# Patient Record
Sex: MALE | Race: WHITE | NOT HISPANIC OR LATINO | URBAN - METROPOLITAN AREA
[De-identification: names, ages, dates, MRNs, and addresses within clinical notes are randomized per-mention and may not be internally consistent; named-entity substitution may affect disease eponyms.]

---

## 2017-06-08 PROBLEM — E78.5 HYPERLIPIDEMIA: Status: ACTIVE | Noted: 2017-06-08

## 2017-06-08 PROBLEM — K76.0 FATTY LIVER: Status: ACTIVE | Noted: 2017-06-08

## 2017-08-01 ENCOUNTER — OFFICE VISIT (OUTPATIENT)
Dept: NEUROLOGY | Facility: CLINIC | Age: 42
End: 2017-08-01

## 2017-08-01 VITALS
BODY MASS INDEX: 36.07 KG/M2 | RESPIRATION RATE: 20 BRPM | DIASTOLIC BLOOD PRESSURE: 70 MMHG | WEIGHT: 238 LBS | HEART RATE: 76 BPM | HEIGHT: 68 IN | SYSTOLIC BLOOD PRESSURE: 118 MMHG

## 2017-08-01 DIAGNOSIS — F95.2 TOURETTE DISORDER: ICD-10-CM

## 2017-08-01 PROCEDURE — 99212 OFFICE O/P EST SF 10 MIN: CPT | Performed by: PSYCHIATRY & NEUROLOGY

## 2017-08-01 RX ORDER — CLONIDINE HYDROCHLORIDE 0.2 MG/1
0.3 TABLET ORAL 2 TIMES DAILY
Qty: 90 TABLET | Refills: 11 | Status: SHIPPED | OUTPATIENT
Start: 2017-08-01 | End: 2018-07-13

## 2017-08-01 NOTE — PROGRESS NOTES
Subjective:     Patient ID: Juan Cralos Georges is a 41 y.o. male.    History of Present Illness  The following portions of the patient's history were reviewed and updated as appropriate: allergies, current medications, past family history, past medical history, past social history, past surgical history and problem list.  Patient developed some gynecomastia has had to stop Risperdal. He still has tics on and off, mosttly controlled.  Review of Systems   Constitutional: Positive for fatigue. Negative for chills, fever and unexpected weight change.   HENT: Negative for ear pain, hearing loss, nosebleeds, rhinorrhea and sore throat.    Eyes: Negative for photophobia, pain, discharge, redness, itching and visual disturbance.        Twitching a lot and lately affecting speech    Respiratory: Negative for cough, chest tightness, shortness of breath and wheezing.    Cardiovascular: Negative for chest pain, palpitations and leg swelling.   Gastrointestinal: Negative for abdominal pain, blood in stool, constipation, diarrhea, nausea and vomiting.   Genitourinary: Negative for dysuria, frequency, hematuria and urgency.   Musculoskeletal: Negative for arthralgias, back pain, gait problem, joint swelling, myalgias, neck pain and neck stiffness.        Joint stiffness, limb pain   Skin: Negative for rash and wound.   Allergic/Immunologic: Negative for environmental allergies and food allergies.   Neurological: Negative for dizziness, tremors, seizures, syncope, speech difficulty, weakness, light-headedness, numbness and headaches.        Twitches   Hematological: Negative for adenopathy. Does not bruise/bleed easily.   Psychiatric/Behavioral: Negative for agitation, confusion, decreased concentration, hallucinations, sleep disturbance and suicidal ideas. The patient is not nervous/anxious.         Objective:    Neurologic Exam     Mental Status   Oriented to person, place, and time.       Physical Exam   Constitutional: He is  oriented to person, place, and time. He appears well-developed and well-nourished.   Cardiovascular: Normal rate and regular rhythm.    Pulmonary/Chest: Effort normal.   Neurological: He is alert and oriented to person, place, and time. He has normal reflexes.   Psychiatric: He has a normal mood and affect. His behavior is normal. Thought content normal.       Assessment/Plan:     Juan Carlos was seen today for tourette syndrome.    Diagnoses and all orders for this visit:    Tourette disorder  -     CloNIDine (CATAPRES) 0.2 MG tablet; Take 1.5 tablets by mouth 2 (Two) Times a Day.

## 2018-07-13 ENCOUNTER — OFFICE VISIT (OUTPATIENT)
Dept: NEUROLOGY | Facility: CLINIC | Age: 43
End: 2018-07-13

## 2018-07-13 VITALS
BODY MASS INDEX: 41.37 KG/M2 | HEIGHT: 68 IN | SYSTOLIC BLOOD PRESSURE: 138 MMHG | OXYGEN SATURATION: 98 % | WEIGHT: 273 LBS | HEART RATE: 67 BPM | DIASTOLIC BLOOD PRESSURE: 92 MMHG

## 2018-07-13 DIAGNOSIS — F95.2 TOURETTE DISORDER: ICD-10-CM

## 2018-07-13 PROCEDURE — 99212 OFFICE O/P EST SF 10 MIN: CPT | Performed by: NURSE PRACTITIONER

## 2018-07-13 RX ORDER — CLONIDINE HYDROCHLORIDE 0.2 MG/1
0.2 TABLET ORAL 3 TIMES DAILY
Qty: 90 TABLET | Refills: 11 | Status: SHIPPED | OUTPATIENT
Start: 2018-07-13 | End: 2019-07-15 | Stop reason: SDUPTHER

## 2018-07-13 NOTE — PROGRESS NOTES
Subjective:     Patient ID: Juan Carlos Georges is a 42 y.o. male.    CC:   Chief Complaint   Patient presents with   • Tourette Syndrome       HPI:   History of Present Illness   Mr Georges Is here today for follow-up on Tourette syndrome with excessive eye blinking.  Symptoms are stable on clonidine 0.2 mg 1 by mouth twice a day to 3 times a day.  He has no adverse side effects from the medication, he has no new symptoms to report today and is feeling well otherwise.    The following portions of the patient's history were reviewed and updated as appropriate: allergies, current medications, past family history, past medical history, past social history, past surgical history and problem list.    Past Medical History:   Diagnosis Date   • Excessive blinking    • Facial spasm        No past surgical history on file.    Social History     Social History   • Marital status:      Spouse name: N/A   • Number of children: N/A   • Years of education: N/A     Occupational History   • Not on file.     Social History Main Topics   • Smoking status: Never Smoker   • Smokeless tobacco: Not on file   • Alcohol use No   • Drug use: No   • Sexual activity: Not on file     Other Topics Concern   • Not on file     Social History Narrative   • No narrative on file       Family History   Problem Relation Age of Onset   • Hypertension Mother    • Hypertension Father    • Cancer Other    • Heart disease Other         Review of Systems   Constitutional: Negative.    HENT: Negative.    Eyes: Negative.    Respiratory: Negative.    Cardiovascular: Negative.    Gastrointestinal: Negative.    Endocrine: Negative.    Genitourinary: Negative.    Musculoskeletal: Negative.    Skin: Negative.    Allergic/Immunologic: Negative.    Neurological: Negative.    Hematological: Negative.    Psychiatric/Behavioral: Negative.         Objective:    Neurologic Exam     Mental Status   Oriented to person, place, and time.   Attention: normal. Concentration:  normal.   Speech: speech is normal   Level of consciousness: alert  Knowledge: consistent with education.   Able to read. Able to write. Normal comprehension.     Cranial Nerves   Cranial nerves II through XII intact.     CN III, IV, VI   Pupils are equal, round, and reactive to light.  Extraocular motions are normal.     Motor Exam   Muscle bulk: normal  Overall muscle tone: normal  Right arm tone: normal  Left arm tone: normal  Right arm pronator drift: absent  Left arm pronator drift: absent  Right leg tone: normal  Left leg tone: normal    Strength   Strength 5/5 throughout. Occasional excessive blinking consistent with motor tic     Gait, Coordination, and Reflexes     Gait  Gait: normal    Coordination   Finger to nose coordination: normal    Tremor   Resting tremor: absent  Intention tremor: absent  Action tremor: absent    Reflexes   Reflexes 2+ except as noted.       Physical Exam   Constitutional: He is oriented to person, place, and time. He appears well-developed and well-nourished. No distress.   HENT:   Head: Normocephalic and atraumatic.   Eyes: EOM are normal. Pupils are equal, round, and reactive to light.   Neck: Normal range of motion. Neck supple.   Cardiovascular: Normal rate.    Pulmonary/Chest: Effort normal. No respiratory distress.   Neurological: He is alert and oriented to person, place, and time. He has normal strength. No cranial nerve deficit. He has a normal Finger-Nose-Finger Test. Gait normal.   Psychiatric: He has a normal mood and affect. His speech is normal and behavior is normal. Judgment and thought content normal.   Vitals reviewed.      Assessment/Plan:       Juan Carlos was seen today for tourette syndrome.    Diagnoses and all orders for this visit:    Tourette disorder  -     CloNIDine (CATAPRES) 0.2 MG tablet; Take 1 tablet by mouth 3 (Three) Times a Day.    Mr. Georges is doing well with no complaints today, his Tourette syndrome and tic are controlled very well with clonidine,  he is taking the medication mostly twice a day occasionally 3 times a day.  Refills were given as noted above.  He'll follow up in 1 year or sooner if needed  Reviewed medications, potential side effects and signs and symptoms to report. Discussed risk versus benefits of treatment plan with patient and/or family-including medications, labs and radiology that may be ordered. Addressed questions and concerns during visit. Patient and/or family verbalized understanding and agree with plan.  EMR Dragon/Transcription Disclaimer:  Much of this encounter note is an electronic transcription of spoken language to printed text. Electronic transcription of spoken language may permit erroneous words or phrases to be inadvertently transcribed. Although I have reviewed the note for such errors, some may still exist in this documentation.           Marion Estevez, APRN  7/13/2018

## 2019-07-15 ENCOUNTER — OFFICE VISIT (OUTPATIENT)
Dept: NEUROLOGY | Facility: CLINIC | Age: 44
End: 2019-07-15

## 2019-07-15 VITALS
WEIGHT: 268 LBS | HEART RATE: 62 BPM | DIASTOLIC BLOOD PRESSURE: 68 MMHG | OXYGEN SATURATION: 98 % | BODY MASS INDEX: 40.62 KG/M2 | HEIGHT: 68 IN | SYSTOLIC BLOOD PRESSURE: 124 MMHG

## 2019-07-15 DIAGNOSIS — F95.2 TOURETTE DISORDER: ICD-10-CM

## 2019-07-15 PROCEDURE — 99212 OFFICE O/P EST SF 10 MIN: CPT | Performed by: NURSE PRACTITIONER

## 2019-07-15 RX ORDER — FENOFIBRATE 160 MG/1
160 TABLET ORAL DAILY
Refills: 2 | COMMUNITY
Start: 2019-06-17 | End: 2023-02-23

## 2019-07-15 RX ORDER — CLONIDINE HYDROCHLORIDE 0.2 MG/1
0.2 TABLET ORAL 3 TIMES DAILY
Qty: 90 TABLET | Refills: 11 | Status: SHIPPED | OUTPATIENT
Start: 2019-07-15 | End: 2020-11-12 | Stop reason: SDUPTHER

## 2019-07-15 RX ORDER — METHOCARBAMOL 500 MG/1
500 TABLET, FILM COATED ORAL 2 TIMES DAILY
Refills: 5 | COMMUNITY
Start: 2019-05-31 | End: 2023-02-23

## 2019-07-15 NOTE — PROGRESS NOTES
Subjective:     Patient ID: Juan Carlos Georges is a 43 y.o. male.    CC:   Chief Complaint   Patient presents with   • Tourette Syndrome       HPI:   History of Present Illness     Mr Georges Is here today for follow-up on Tourette syndrome with excessive eye blinking.  Symptoms are stable on clonidine 0.2 mg 1 by mouth twice a day to 3 times a day.  With mediations he has not neck jerking or grunting.  Eye blinking continues but is much improved and tolerable.  He has no adverse side effects from the medication, he has no new symptoms to report today and is feeling well otherwise.  He follows with PCP regularly for HTN  The following portions of the patient's history were reviewed and updated as appropriate: allergies, current medications, past family history, past medical history, past social history, past surgical history and problem list.    Past Medical History:   Diagnosis Date   • Excessive blinking    • Facial spasm        No past surgical history on file.    Social History     Socioeconomic History   • Marital status:      Spouse name: Not on file   • Number of children: Not on file   • Years of education: Not on file   • Highest education level: Not on file   Tobacco Use   • Smoking status: Never Smoker   Substance and Sexual Activity   • Alcohol use: No   • Drug use: No       Family History   Problem Relation Age of Onset   • Hypertension Mother    • Hypertension Father    • Cancer Other    • Heart disease Other         Review of Systems   Constitutional: Negative.    HENT: Negative.    Eyes: Negative.    Respiratory: Negative.    Cardiovascular: Negative.    Gastrointestinal: Negative.    Endocrine: Negative.    Genitourinary: Negative.    Musculoskeletal: Negative.    Skin: Negative.    Allergic/Immunologic: Negative.    Neurological: Negative.    Hematological: Negative.    Psychiatric/Behavioral: Negative.         Objective:    Neurologic Exam     Mental Status   Oriented to person, place, and time.    Attention: normal. Concentration: normal.   Speech: speech is normal   Level of consciousness: alert  Knowledge: consistent with education.   Able to read. Able to write. Normal comprehension.     Cranial Nerves   Cranial nerves II through XII intact.     CN II   Visual fields full to confrontation.   Right visual field deficit: none  Left visual field deficit: none     CN III, IV, VI   Pupils are equal, round, and reactive to light.  Extraocular motions are normal.   Right pupil: Shape: regular. Reactivity: brisk. Consensual response: intact. Accommodation: intact.   Left pupil: Shape: regular. Reactivity: brisk. Consensual response: intact. Accommodation: intact.   CN III: no CN III palsy  CN VI: no CN VI palsy  Nystagmus: none   Upgaze: normal  Downgaze: normal    CN V   Facial sensation intact.     CN VII   Facial expression full, symmetric.     CN VIII   CN VIII normal.     CN IX, X   CN IX normal.   CN X normal.     CN XI   CN XI normal.     CN XII   CN XII normal.   Eye blinking throughout exam      Motor Exam   Muscle bulk: normal  Overall muscle tone: normal  Right arm tone: normal  Left arm tone: normal  Right arm pronator drift: absent  Left arm pronator drift: absent  Right leg tone: normal  Left leg tone: normal    Strength   Strength 5/5 throughout.     Sensory Exam   Light touch normal.     Gait, Coordination, and Reflexes     Gait  Gait: normal    Coordination   Romberg: negative  Finger to nose coordination: normal    Tremor   Resting tremor: absent  Intention tremor: absent  Action tremor: absent    Reflexes   Reflexes 2+ except as noted.   Right Gonzáles: absent  Left Gonzáles: absent      Physical Exam   Constitutional: He is oriented to person, place, and time. He appears well-developed and well-nourished.   HENT:   Head: Normocephalic and atraumatic.   Eyes: Conjunctivae and EOM are normal. Pupils are equal, round, and reactive to light. No scleral icterus.   Neck: Normal range of motion. Neck  supple.   Pulmonary/Chest: Effort normal. No respiratory distress.   Neurological: He is alert and oriented to person, place, and time. He has normal strength. He has a normal Finger-Nose-Finger Test and a normal Romberg Test. Gait normal.   Skin: Skin is warm.   Psychiatric: He has a normal mood and affect. His speech is normal and behavior is normal. Judgment and thought content normal.   Vitals reviewed.      Assessment/Plan:       Juan Carlos was seen today for tourette syndrome.    Diagnoses and all orders for this visit:    Tourette disorder  -     CloNIDine (CATAPRES) 0.2 MG tablet; Take 1 tablet by mouth 3 (Three) Times a Day.    Mr. yap is doing well, he reports his symptoms are controlled on clonidine, he continues to have eye blinking which is much improved and tolerable, he is typically taking only 2 doses per day, when symptoms are increase he is taking the third dose.  Will send in for 3 times daily dosing as previously prescribed.  He will continue follow-up with primary care for blood pressure.  We will see him back in 1 year or sooner if needed.  If he has any questions concerns or problems prior to follow-up appointment he will notify our office ASAP.  Reviewed medications, potential side effects and signs and symptoms to report. Discussed risk versus benefits of treatment plan with patient and/or family-including medications, labs and radiology that may be ordered. Addressed questions and concerns during visit. Patient and/or family verbalized understanding and agree with plan.  EMR Dragon/Transcription Disclaimer:  Much of this encounter note is an electronic transcription of spoken language to printed text. Electronic transcription of spoken language may permit erroneous words or phrases to be inadvertently transcribed. Although I have reviewed the note for such errors, some may still exist in this documentation.             Marion Estevez, APRN  7/15/2019      '

## 2020-11-12 DIAGNOSIS — F95.2 TOURETTE DISORDER: ICD-10-CM

## 2020-11-12 RX ORDER — CLONIDINE HYDROCHLORIDE 0.2 MG/1
0.2 TABLET ORAL 3 TIMES DAILY
Qty: 90 TABLET | Refills: 11 | Status: SHIPPED | OUTPATIENT
Start: 2020-11-12 | End: 2023-02-23

## 2023-02-23 ENCOUNTER — APPOINTMENT (OUTPATIENT)
Dept: CT IMAGING | Facility: HOSPITAL | Age: 48
End: 2023-02-23
Payer: COMMERCIAL

## 2023-02-23 ENCOUNTER — HOSPITAL ENCOUNTER (EMERGENCY)
Facility: HOSPITAL | Age: 48
Discharge: HOME | End: 2023-02-23
Attending: EMERGENCY MEDICINE | Admitting: EMERGENCY MEDICINE
Payer: COMMERCIAL

## 2023-02-23 VITALS
TEMPERATURE: 98.9 F | DIASTOLIC BLOOD PRESSURE: 65 MMHG | HEIGHT: 70 IN | HEART RATE: 83 BPM | BODY MASS INDEX: 39.77 KG/M2 | OXYGEN SATURATION: 97 % | WEIGHT: 277.78 LBS | SYSTOLIC BLOOD PRESSURE: 131 MMHG | RESPIRATION RATE: 18 BRPM

## 2023-02-23 DIAGNOSIS — M54.16 ACUTE RIGHT LUMBAR RADICULOPATHY: Primary | ICD-10-CM

## 2023-02-23 DIAGNOSIS — R10.9 ACUTE RIGHT FLANK PAIN: ICD-10-CM

## 2023-02-23 LAB
ALBUMIN SERPL-MCNC: 4.6 G/DL (ref 3.5–5.2)
ALBUMIN/GLOB SERPL: 1.6 G/DL
ALP SERPL-CCNC: 53 U/L (ref 39–117)
ALT SERPL W P-5'-P-CCNC: 64 U/L (ref 1–41)
ANION GAP SERPL CALCULATED.3IONS-SCNC: 10.5 MMOL/L (ref 5–15)
AST SERPL-CCNC: 39 U/L (ref 1–40)
BACTERIA UR QL AUTO: ABNORMAL /HPF
BASOPHILS # BLD AUTO: 0.09 10*3/MM3 (ref 0–0.2)
BASOPHILS NFR BLD AUTO: 1.3 % (ref 0–1.5)
BILIRUB SERPL-MCNC: 0.4 MG/DL (ref 0–1.2)
BILIRUB UR QL STRIP: NEGATIVE
BUN SERPL-MCNC: 17 MG/DL (ref 6–20)
BUN/CREAT SERPL: 12.4 (ref 7–25)
CALCIUM SPEC-SCNC: 10.3 MG/DL (ref 8.6–10.5)
CHLORIDE SERPL-SCNC: 99 MMOL/L (ref 98–107)
CLARITY UR: CLEAR
CO2 SERPL-SCNC: 26.5 MMOL/L (ref 22–29)
COLOR UR: YELLOW
CREAT SERPL-MCNC: 1.37 MG/DL (ref 0.76–1.27)
D-LACTATE SERPL-SCNC: 0.7 MMOL/L (ref 0.5–2)
DEPRECATED RDW RBC AUTO: 41.2 FL (ref 37–54)
EGFRCR SERPLBLD CKD-EPI 2021: 63.6 ML/MIN/1.73
EOSINOPHIL # BLD AUTO: 0.19 10*3/MM3 (ref 0–0.4)
EOSINOPHIL NFR BLD AUTO: 2.6 % (ref 0.3–6.2)
ERYTHROCYTE [DISTWIDTH] IN BLOOD BY AUTOMATED COUNT: 12.9 % (ref 12.3–15.4)
GLOBULIN UR ELPH-MCNC: 2.9 GM/DL
GLUCOSE SERPL-MCNC: 131 MG/DL (ref 65–99)
GLUCOSE UR STRIP-MCNC: NEGATIVE MG/DL
HCT VFR BLD AUTO: 43 % (ref 37.5–51)
HGB BLD-MCNC: 15.1 G/DL (ref 13–17.7)
HGB UR QL STRIP.AUTO: NEGATIVE
HOLD SPECIMEN: NORMAL
HOLD SPECIMEN: NORMAL
HYALINE CASTS UR QL AUTO: ABNORMAL /LPF
IMM GRANULOCYTES # BLD AUTO: 0.02 10*3/MM3 (ref 0–0.05)
IMM GRANULOCYTES NFR BLD AUTO: 0.3 % (ref 0–0.5)
KETONES UR QL STRIP: NEGATIVE
LEUKOCYTE ESTERASE UR QL STRIP.AUTO: NEGATIVE
LIPASE SERPL-CCNC: 51 U/L (ref 13–60)
LYMPHOCYTES # BLD AUTO: 1.96 10*3/MM3 (ref 0.7–3.1)
LYMPHOCYTES NFR BLD AUTO: 27.3 % (ref 19.6–45.3)
MCH RBC QN AUTO: 30.8 PG (ref 26.6–33)
MCHC RBC AUTO-ENTMCNC: 35.1 G/DL (ref 31.5–35.7)
MCV RBC AUTO: 87.6 FL (ref 79–97)
MONOCYTES # BLD AUTO: 0.45 10*3/MM3 (ref 0.1–0.9)
MONOCYTES NFR BLD AUTO: 6.3 % (ref 5–12)
NEUTROPHILS NFR BLD AUTO: 4.48 10*3/MM3 (ref 1.7–7)
NEUTROPHILS NFR BLD AUTO: 62.2 % (ref 42.7–76)
NITRITE UR QL STRIP: NEGATIVE
NRBC BLD AUTO-RTO: 0 /100 WBC (ref 0–0.2)
PH UR STRIP.AUTO: 7 [PH] (ref 5–8)
PLATELET # BLD AUTO: 280 10*3/MM3 (ref 140–450)
PMV BLD AUTO: 9.3 FL (ref 6–12)
POTASSIUM SERPL-SCNC: 4 MMOL/L (ref 3.5–5.2)
PROT SERPL-MCNC: 7.5 G/DL (ref 6–8.5)
PROT UR QL STRIP: ABNORMAL
RBC # BLD AUTO: 4.91 10*6/MM3 (ref 4.14–5.8)
RBC # UR STRIP: ABNORMAL /HPF
REF LAB TEST METHOD: ABNORMAL
SODIUM SERPL-SCNC: 136 MMOL/L (ref 136–145)
SP GR UR STRIP: 1.02 (ref 1–1.03)
SQUAMOUS #/AREA URNS HPF: ABNORMAL /HPF
UROBILINOGEN UR QL STRIP: ABNORMAL
WBC # UR STRIP: ABNORMAL /HPF
WBC NRBC COR # BLD: 7.19 10*3/MM3 (ref 3.4–10.8)
WHOLE BLOOD HOLD COAG: NORMAL
WHOLE BLOOD HOLD SPECIMEN: NORMAL

## 2023-02-23 PROCEDURE — 80053 COMPREHEN METABOLIC PANEL: CPT | Performed by: EMERGENCY MEDICINE

## 2023-02-23 PROCEDURE — 74176 CT ABD & PELVIS W/O CONTRAST: CPT

## 2023-02-23 PROCEDURE — 36415 COLL VENOUS BLD VENIPUNCTURE: CPT

## 2023-02-23 PROCEDURE — 81001 URINALYSIS AUTO W/SCOPE: CPT | Performed by: EMERGENCY MEDICINE

## 2023-02-23 PROCEDURE — 85025 COMPLETE CBC W/AUTO DIFF WBC: CPT | Performed by: EMERGENCY MEDICINE

## 2023-02-23 PROCEDURE — 83605 ASSAY OF LACTIC ACID: CPT | Performed by: NURSE PRACTITIONER

## 2023-02-23 PROCEDURE — 99283 EMERGENCY DEPT VISIT LOW MDM: CPT

## 2023-02-23 PROCEDURE — 25010000002 KETOROLAC TROMETHAMINE PER 15 MG: Performed by: EMERGENCY MEDICINE

## 2023-02-23 PROCEDURE — 83690 ASSAY OF LIPASE: CPT | Performed by: EMERGENCY MEDICINE

## 2023-02-23 PROCEDURE — 96374 THER/PROPH/DIAG INJ IV PUSH: CPT

## 2023-02-23 RX ORDER — LISINOPRIL 20 MG/1
20 TABLET ORAL
COMMUNITY
Start: 2023-02-20

## 2023-02-23 RX ORDER — CLONIDINE HYDROCHLORIDE 0.2 MG/1
0.2 TABLET ORAL
COMMUNITY
Start: 2023-02-20

## 2023-02-23 RX ORDER — SUCRALFATE 1 G/1
TABLET ORAL
COMMUNITY
Start: 2023-02-07

## 2023-02-23 RX ORDER — METHOCARBAMOL 500 MG/1
500 TABLET, FILM COATED ORAL
COMMUNITY
Start: 2023-02-20

## 2023-02-23 RX ORDER — OMEPRAZOLE 40 MG/1
40 CAPSULE, DELAYED RELEASE ORAL
COMMUNITY
Start: 2023-02-14 | End: 2023-08-13

## 2023-02-23 RX ORDER — FENOFIBRATE 160 MG/1
160 TABLET ORAL
COMMUNITY
Start: 2023-02-20

## 2023-02-23 RX ORDER — TIRZEPATIDE 2.5 MG/.5ML
INJECTION, SOLUTION SUBCUTANEOUS
COMMUNITY
Start: 2023-02-07

## 2023-02-23 RX ORDER — SODIUM CHLORIDE 0.9 % (FLUSH) 0.9 %
10 SYRINGE (ML) INJECTION AS NEEDED
Status: DISCONTINUED | OUTPATIENT
Start: 2023-02-23 | End: 2023-02-24 | Stop reason: HOSPADM

## 2023-02-23 RX ORDER — GABAPENTIN 400 MG/1
400 CAPSULE ORAL
COMMUNITY
Start: 2023-02-20

## 2023-02-23 RX ORDER — KETOROLAC TROMETHAMINE 30 MG/ML
30 INJECTION, SOLUTION INTRAMUSCULAR; INTRAVENOUS ONCE
Status: COMPLETED | OUTPATIENT
Start: 2023-02-23 | End: 2023-02-23

## 2023-02-23 RX ORDER — PRAMIPEXOLE DIHYDROCHLORIDE 0.25 MG/1
0.25 TABLET ORAL EVERY EVENING
COMMUNITY
Start: 2023-02-07

## 2023-02-23 RX ADMIN — KETOROLAC TROMETHAMINE 30 MG: 30 INJECTION, SOLUTION INTRAMUSCULAR; INTRAVENOUS at 22:02

## 2023-02-24 NOTE — DISCHARGE INSTRUCTIONS
Your CT scan today did not show any kidney stone coming down although you are currently seem to be making some tiny kidney stone in the right kidney but it should not be causing any pain currently.    No signs of gallbladder or appendicitis issues were found and no right-sided hernia seen.    It sounds like you are having some right-sided lower back pain that is radiating around the right side of your abdomen consistent with a radiculopathy (pinched nerve) for which you can take your ibuprofen 800 and gabapentin as needed and use pain medicine if needed.    Follow-up with your doctor if it does not go away in the next few days with time and rest.

## 2023-02-24 NOTE — ED NOTES
"Time: 8:30 PM EST  Date of encounter:  2/23/2023  Independent Historian/Clinical History and Information was obtained by:   Patient  Chief Complaint: low back pain     History is limited by: N/A, N/A    History of Present Illness:  Patient is a 47 y.o. year old male who presents to the emergency department for evaluation of right sided low back pain that radiates into the groin \"occasionally.\"  The pt states he has had kidney stones in the past and this feels like a kidney stone.  The pt states the pain has waxed and waned for the past 3 weeks.  The pt denies fevers, chills, CP, and SOA.  The pt denies fall or traumatic injury.  The pt states he has not noticed any hematuria.      HPI    Patient Care Team  Primary Care Provider: Joe García APRN    Past Medical History:     No Known Allergies  Past Medical History:   Diagnosis Date   • Excessive blinking    • Facial spasm      History reviewed. No pertinent surgical history.  Family History   Problem Relation Age of Onset   • Hypertension Mother    • Hypertension Father    • Cancer Other    • Heart disease Other        Home Medications:  Prior to Admission medications    Medication Sig Start Date End Date Taking? Authorizing Provider   cloNIDine (CATAPRES) 0.2 MG tablet Take 0.2 mg by mouth. 2/20/23  Yes ProviderZeb MD   fenofibrate 160 MG tablet Take 160 mg by mouth. 2/20/23  Yes Provider, MD eZb   lisinopril (PRINIVIL,ZESTRIL) 20 MG tablet Take 20 mg by mouth. 2/20/23  Yes ProviderZeb MD   methocarbamol (ROBAXIN) 500 MG tablet Take 500 mg by mouth. 2/20/23  Yes ProviderZeb MD   omeprazole (priLOSEC) 40 MG capsule Take 40 mg by mouth. 2/14/23 8/13/23 Yes Provider, MD Zeb   Tirzepatide (Mounjaro) 2.5 MG/0.5ML solution pen-injector INJECT 2.5MG SUBCUTANEOUSLY ONCE A WEEK 2/7/23  Yes ProviderZeb MD   anastrozole (ARIMIDEX) 1 MG chemo tablet  5/3/16   ProviderZeb MD   gabapentin (NEURONTIN) 400 MG " "capsule Take 400 mg by mouth every night at bedtime. 2/20/23   Zeb Larkin MD   pramipexole (MIRAPEX) 0.25 MG tablet Take 0.25 mg by mouth Every Evening. 2/7/23   Zeb Larkin MD   sucralfate (CARAFATE) 1 g tablet TAKE 1 TABLET BY MOUTH 1 HOUR BEFORE MORNING MEAL, LUNCH, EVENING MEAL AND AT BEDTIME 2/7/23   Zeb Larkin MD   Testosterone Cypionate (DEPO-TESTOSTERONE) 200 MG/ML injection Apply  to cheek. 7/7/14   Zeb Larkin MD   cloNIDine (CATAPRES) 0.2 MG tablet Take 1 tablet by mouth 3 (Three) Times a Day. 11/12/20 2/23/23  Tadeo Benitez MD   fenofibrate 160 MG tablet Take 160 mg by mouth Daily. 6/17/19 2/23/23  Zeb Larkin MD   lisinopril (PRINIVIL,ZESTRIL) 40 MG tablet  7/12/16 2/23/23  Zeb Larkin MD   methocarbamol (ROBAXIN) 500 MG tablet Take 500 mg by mouth 2 (Two) Times a Day. 5/31/19 2/23/23  Zeb Larkin MD        Social History:   Social History     Tobacco Use   • Smoking status: Never   Substance Use Topics   • Alcohol use: No   • Drug use: No         Review of Systems:  Review of Systems   I performed a 10 point review of systems which was all negative, except for the positives found in the HPI above.    Physical Exam:  /73   Pulse 86   Temp 98.9 °F (37.2 °C)   Resp 18   Ht 177.8 cm (70\")   Wt 126 kg (278 lb)   SpO2 98%   BMI 39.89 kg/m²     Physical Exam   General: Awake alert and in no obvious distress     HEENT: Head normocephalic atraumatic, eyes PERRLA EOMI, nose normal, oropharynx normal.     Neck: Supple full range of motion, no meningismus, no lymphadenopathy     Heart: Regular rate and rhythm, no murmurs or rubs, 2+ radial pulses bilaterally     Lungs: Clear to auscultation bilaterally without wheezes or crackles, no respiratory distress     Abdomen: Soft, nontender, nondistended, no rebound or guarding     Skin: Warm, dry, no rash     Musculoskeletal: Normal range of motion, no lower extremity edema, " Right sided CVA tenderness, mild discomfort upon leg elevation but numbness/tingling      Neurologic: Oriented x3, no motor deficits no sensory deficits     Psychiatric: Mood appears stable, no psychosis            Procedures:  Procedures      Medical Decision Making:      Comorbidities that affect care:    fatty liver, hyperlipidemia     External Notes reviewed:    Previous Clinic Note: from 02/20/2023 for restless leg syndrome       The following orders were placed and all results were independently analyzed by me:  Orders Placed This Encounter   Procedures   • CT Abdomen Pelvis Without Contrast   • [place] Draw   • Comprehensive Metabolic Panel   • Lipase   • Urinalysis With Microscopic If Indicated (No Culture) - Urine, Clean Catch   • CBC Auto Differential   • Lactic Acid, Plasma   • NPO Diet NPO Type: Strict NPO   • Undress & Gown   • Insert Peripheral IV   • CBC & Differential   • Green Top (Gel)   • Lavender Top   • Gold Top - SST   • Light Blue Top       Medications Given in the Emergency Department:  Medications   sodium chloride 0.9 % flush 10 mL (has no administration in time range)        ED Course:         Labs:    Lab Results (last 24 hours)     Procedure Component Value Units Date/Time    CBC & Differential [[id number]]  (Normal) Collected: 02/23/23 1930    Specimen: Blood Updated: 02/23/23 1953    Narrative:      The following orders were created for panel order CBC & Differential.  Procedure                               Abnormality         Status                     ---------                               -----------         ------                     CBC Auto Differential[[id number]]        Normal              Final result                 Please view results for these tests on the individual orders.    Comprehensive Metabolic Panel [[id number]]  (Abnormal) Collected: 02/23/23 1930    Specimen: Blood Updated: 02/23/23 2019     Glucose 131 mg/dL      BUN 17 mg/dL      Creatinine 1.37 mg/dL      Sodium  136 mmol/L      Potassium 4.0 mmol/L      Comment: Slight hemolysis detected by analyzer. Results may be affected.        Chloride 99 mmol/L      CO2 26.5 mmol/L      Calcium 10.3 mg/dL      Total Protein 7.5 g/dL      Albumin 4.6 g/dL      ALT (SGPT) 64 U/L      AST (SGOT) 39 U/L      Alkaline Phosphatase 53 U/L      Total Bilirubin 0.4 mg/dL      Globulin 2.9 gm/dL      A/G Ratio 1.6 g/dL      BUN/Creatinine Ratio 12.4     Anion Gap 10.5 mmol/L      eGFR 63.6 mL/min/1.73     Narrative:      GFR Normal >60  Chronic Kidney Disease <60  Kidney Failure <15      Lipase [[id number]]  (Normal) Collected: 02/23/23 1930    Specimen: Blood Updated: 02/23/23 2019     Lipase 51 U/L     CBC Auto Differential [[id number]]  (Normal) Collected: 02/23/23 1930    Specimen: Blood Updated: 02/23/23 1953     WBC 7.19 10*3/mm3      RBC 4.91 10*6/mm3      Hemoglobin 15.1 g/dL      Hematocrit 43.0 %      MCV 87.6 fL      MCH 30.8 pg      MCHC 35.1 g/dL      RDW 12.9 %      RDW-SD 41.2 fl      MPV 9.3 fL      Platelets 280 10*3/mm3      Neutrophil % 62.2 %      Lymphocyte % 27.3 %      Monocyte % 6.3 %      Eosinophil % 2.6 %      Basophil % 1.3 %      Immature Grans % 0.3 %      Neutrophils, Absolute 4.48 10*3/mm3      Lymphocytes, Absolute 1.96 10*3/mm3      Monocytes, Absolute 0.45 10*3/mm3      Eosinophils, Absolute 0.19 10*3/mm3      Basophils, Absolute 0.09 10*3/mm3      Immature Grans, Absolute 0.02 10*3/mm3      nRBC 0.0 /100 WBC     Lactic Acid, Plasma [[id number]]  (Normal) Collected: 02/23/23 1931    Specimen: Blood Updated: 02/23/23 2017     Lactate 0.7 mmol/L     Urinalysis With Microscopic If Indicated (No Culture) - Urine, Clean Catch [[id number]] Collected: 02/23/23 1956    Specimen: Urine, Clean Catch Updated: 02/23/23 2028           Imaging:    No Radiology Exams Resulted Within Past 24 Hours      Differential Diagnosis and Discussion:    Abdominal Pain: Based on the patient's signs and symptoms, I considered abdominal  aortic aneurysm, small bowel obstruction, pancreatitis, acute cholecystitis, acute appendecitis, peptic ulcer disease, gastritis, colitis, endocrine disorders, irritable bowel syndrome and other differential diagnosis an etiology of the patient's abdominal pain.  Back Pain: The patient presents with back pain. My differential diagnosis includes but is not limited to acute spinal epidural abscess, acute spinal epidural bleed, cauda equina syndrome, abdominal aortic aneurysm, aortic dissection, kidney stone, pyelonephritis, musculoskeletal back pain, spinal fracture, and osteoarthritis.     All labs were reviewed and interpreted by me.  CT scan radiology interpretation was reviewed by me.    MDM     {Critical Care:70996}    Patient Care Considerations:    {Considerations (Optional):70098}      Consultants/Shared Management Plan:    {Shared Management Plan (Optional):82219}    Social Determinants of Health:    Patient is independent, reliable, and has access to care.       Disposition and Care Coordination:    {Admission consideration:65446}    {Discharge (Optional):79712}    Final diagnoses:   None        ED Disposition     None          This medical record created using voice recognition software.

## 2023-02-24 NOTE — ED PROVIDER NOTES
"Time: 7:22 PM EST  Date of encounter:  2/23/2023  Independent Historian/Clinical History and Information was obtained by:   Patient  Chief Complaint   Patient presents with   • Abdominal Pain   • Groin Pain       History is limited by: N/A    History of Present Illness:  Patient is a 47 y.o. year old male who presents to the emergency department for evaluation of pain around his belt line going all the way around that started around mid January.  He is from [facility].  He travels for [place] in [employer] and has been avoiding getting it checked out.  He said he was in [place] mid January when it started and started in his right side low back and goes all the way around and down into his groin area.  He says that yesterday he had issues with urination and had a delayed start.  He also reports he has been having some sharp burning pain in his penis remotely.  He reports constipation.  He was seen by his primary care provider on Monday for blood work and was instructed to come to the ER if his pain got worse.  He also says that he saw his liver doctor a while back for blood work and that they want him to see a nephrologist because his creatinine at all that is \"out of whack\".  He also advises that he has some constipation.    HPI    Patient Care Team  Primary Care Provider: Joe García APRN    Past Medical History:     No Known Allergies  Past Medical History:   Diagnosis Date   • Excessive blinking    • Facial spasm      History reviewed. No pertinent surgical history.  Family History   Problem Relation Age of Onset   • Hypertension Mother    • Hypertension Father    • Cancer Other    • Heart disease Other        Home Medications:  Prior to Admission medications    Medication Sig Start Date End Date Taking? Authorizing Provider   anastrozole (ARIMIDEX) 1 MG chemo tablet  5/3/16   Provider, MD Zeb   cloNIDine (CATAPRES) 0.2 MG tablet Take 1 tablet by mouth 3 (Three) Times a Day. 11/12/20   " "Tadeo Benitez MD   fenofibrate 160 MG tablet Take 160 mg by mouth Daily. 6/17/19   Zeb Larkin MD   lisinopril (PRINIVIL,ZESTRIL) 40 MG tablet  7/12/16   Zeb Larkin MD   methocarbamol (ROBAXIN) 500 MG tablet Take 500 mg by mouth 2 (Two) Times a Day. 5/31/19   Zeb Larkin MD   Testosterone Cypionate (DEPO-TESTOSTERONE) 200 MG/ML injection Apply  to cheek. 7/7/14   Zeb Larkin MD        Social History:   Social History     Tobacco Use   • Smoking status: Never   Substance Use Topics   • Alcohol use: No   • Drug use: No         Review of Systems:  Review of Systems   Constitutional: Negative for fever.   Gastrointestinal: Positive for abdominal pain and constipation. Negative for nausea and vomiting.   Genitourinary: Positive for difficulty urinating, flank pain and penile pain.   Musculoskeletal: Positive for back pain.        Physical Exam:  /65   Pulse 83   Temp 98.9 °F (37.2 °C) (Oral)   Resp 18   Ht 177.8 cm (70\")   Wt 126 kg (277 lb 12.5 oz)   SpO2 97%   BMI 39.86 kg/m²     Physical Exam  Constitutional:       General: He is not in acute distress.     Appearance: He is obese. He is not ill-appearing or toxic-appearing.   Cardiovascular:      Rate and Rhythm: Normal rate.   Pulmonary:      Effort: Pulmonary effort is normal. No respiratory distress.   Abdominal:      Tenderness: There is abdominal tenderness in the right lower quadrant, suprapubic area and left lower quadrant. There is right CVA tenderness and left CVA tenderness.   Neurological:      General: No focal deficit present.      Mental Status: He is alert and oriented to person, place, and time.                  Procedures:  Procedures      Medical Decision Making:      Comorbidities that affect care:    History of kidney stones reported    External Notes reviewed:    None      The following orders were placed and all results were independently analyzed by me:  Orders Placed This Encounter "   Procedures   • CT Abdomen Pelvis Without Contrast   • [place] Draw   • Comprehensive Metabolic Panel   • Lipase   • Urinalysis With Microscopic If Indicated (No Culture) - Urine, Clean Catch   • CBC Auto Differential   • Lactic Acid, Plasma   • Urinalysis, Microscopic Only - Urine, Clean Catch   • Undress & Gown   • CBC & Differential   • Green Top (Gel)   • Lavender Top   • Gold Top - SST   • Light Blue Top       Medications Given in the Emergency Department:  Medications   ketorolac (TORADOL) injection 30 mg (30 mg Intravenous Given 2/23/23 2202)        ED Course:        ED Course as of 02/24/23 0210   Thu Feb 23, 2023 2250 RBC: 4.91 [VS]      ED Course User Index  [VS] Christopher Mars MD       Labs:    Lab Results (last 24 hours)     Procedure Component Value Units Date/Time    CBC & Differential [[id number]]  (Normal) Collected: 02/23/23 1930    Specimen: Blood Updated: 02/23/23 1953    Narrative:      The following orders were created for panel order CBC & Differential.  Procedure                               Abnormality         Status                     ---------                               -----------         ------                     CBC Auto Differential[[id number]]        Normal              Final result                 Please view results for these tests on the individual orders.    Comprehensive Metabolic Panel [[id number]]  (Abnormal) Collected: 02/23/23 1930    Specimen: Blood Updated: 02/23/23 2019     Glucose 131 mg/dL      BUN 17 mg/dL      Creatinine 1.37 mg/dL      Sodium 136 mmol/L      Potassium 4.0 mmol/L      Comment: Slight hemolysis detected by analyzer. Results may be affected.        Chloride 99 mmol/L      CO2 26.5 mmol/L      Calcium 10.3 mg/dL      Total Protein 7.5 g/dL      Albumin 4.6 g/dL      ALT (SGPT) 64 U/L      AST (SGOT) 39 U/L      Alkaline Phosphatase 53 U/L      Total Bilirubin 0.4 mg/dL      Globulin 2.9 gm/dL      A/G Ratio 1.6 g/dL      BUN/Creatinine Ratio 12.4      Anion Gap 10.5 mmol/L      eGFR 63.6 mL/min/1.73     Narrative:      GFR Normal >60  Chronic Kidney Disease <60  Kidney Failure <15      Lipase [[id number]]  (Normal) Collected: 02/23/23 1930    Specimen: Blood Updated: 02/23/23 2019     Lipase 51 U/L     CBC Auto Differential [[id number]]  (Normal) Collected: 02/23/23 1930    Specimen: Blood Updated: 02/23/23 1953     WBC 7.19 10*3/mm3      RBC 4.91 10*6/mm3      Hemoglobin 15.1 g/dL      Hematocrit 43.0 %      MCV 87.6 fL      MCH 30.8 pg      MCHC 35.1 g/dL      RDW 12.9 %      RDW-SD 41.2 fl      MPV 9.3 fL      Platelets 280 10*3/mm3      Neutrophil % 62.2 %      Lymphocyte % 27.3 %      Monocyte % 6.3 %      Eosinophil % 2.6 %      Basophil % 1.3 %      Immature Grans % 0.3 %      Neutrophils, Absolute 4.48 10*3/mm3      Lymphocytes, Absolute 1.96 10*3/mm3      Monocytes, Absolute 0.45 10*3/mm3      Eosinophils, Absolute 0.19 10*3/mm3      Basophils, Absolute 0.09 10*3/mm3      Immature Grans, Absolute 0.02 10*3/mm3      nRBC 0.0 /100 WBC     Lactic Acid, Plasma [[id number]]  (Normal) Collected: 02/23/23 1931    Specimen: Blood Updated: 02/23/23 2017     Lactate 0.7 mmol/L     Urinalysis With Microscopic If Indicated (No Culture) - Urine, Clean Catch [[id number]]  (Abnormal) Collected: 02/23/23 1956    Specimen: Urine, Clean Catch Updated: 02/23/23 2044     Color, UA Yellow     Appearance, UA Clear     pH, UA 7.0     Specific Gravity, UA 1.020     Glucose, UA Negative     Ketones, UA Negative     Bilirubin, UA Negative     Blood, UA Negative     Protein,  mg/dL (2+)     Leuk Esterase, UA Negative     Nitrite, UA Negative     Urobilinogen, UA 1.0 E.U./dL    Urinalysis, Microscopic Only - Urine, Clean Catch [[id number]]  (Abnormal) Collected: 02/23/23 1956    Specimen: Urine, Clean Catch Updated: 02/23/23 2044     RBC, UA 0-2 /HPF      WBC, UA 0-2 /HPF      Bacteria, UA None Seen /HPF      Squamous Epithelial Cells, UA 0-2 /HPF      Hyaline Casts, UA 0-2 /LPF       Methodology Automated Microscopy           Imaging:    CT Abdomen Pelvis Without Contrast    Result Date: 2/23/2023  PROCEDURE: CT ABDOMEN PELVIS WO CONTRAST  COMPARISON: None.  INDICATIONS: RIGHT LOWER QUADRANT ABD. PAIN THAT RADIATES AROUND TO RIGHT FLANK PAIN.  TECHNIQUE: 718 CT images were created without intravenous or oral contrast agent administration.   PROTOCOL:   Standard CT imaging protocol performed.    RADIATION:   Total DLP: 784 mGy*cm   Automated exposure control was utilized to minimize radiation dose.  FINDINGS:  No acute findings are identified.  No acute cholecystitis or pancreatitis.  No gallstones.  No biliary ductal dilatation.  No mechanical bowel obstruction.  No pathologic bowel wall thickening.  No pneumoperitoneum or pneumatosis.  No portal or mesenteric venous gas.  The appendix is within normal limits, well seen on axial image 146 of series 2 and adjacent images.  No acute colitis or diverticulitis.  There are scattered colonic diverticula.  Nonobstructing nephrolithiasis is seen with calyceal stones measuring 3 mm or less.  No ureterolithiasis is seen.  No hydronephrosis.  No obstructive uropathy is identified.  No ascites.  No enlarged intra-abdominal or intrapelvic lymph nodes.  No adrenal mass.  No acute findings are seen with regard to the liver or spleen.  There is diffuse hepatic steatosis with hepatomegaly.  The maximum craniocaudal dimension of the right lobe of the liver is 24.9 cm.  No splenomegaly.  No acute infiltrate is seen in the partially imaged lung bases.  There is a tiny fat containing umbilical hernia.  It does not contain bowel.  Ossification of the anterior longitudinal ligament is seen and may represent diffuse idiopathic skeletal hyperostosis (DISH).  Degenerative changes involve the imaged spine, especially at T12-L1.  No acute fracture.  No aggressive osseous lesion.  There is bilateral L5 spondylolysis with minimal if any spondylolisthesis of L5 upon S1.   There is mild diffuse prostatomegaly.  There is a possible urachal remnant, such as seen on sagittal images 111 through 118 of series 6.  There is a small fat-containing umbilical hernia.  It does not contain bowel.  Mild atherosclerotic changes are seen without aneurysmal dilatation of the aortoiliac arterial system.  The maximum diameter of the infrarenal abdominal aorta is approximately 2.1 cm.  No acute intraperitoneal or retroperitoneal hemorrhage.         No acute findings are seen in the abdomen or pelvis by nonenhanced CT examination.    Please note that portions of this note were completed with a voice recognition program.  MARIBELL FUNES JR, MD       Electronically Signed and Approved By: MARIBELL FUNES JR, MD on 2/23/2023 at 22:47                  Differential Diagnosis and Discussion:    My differential diagnosis in this patient with flank pain includes but not limited to: Renal colic from obstructing stone, pyelonephritis, musculoskeletal back pain, atypical presentation of diverticulitis or colitis.      Abdominal Pain: Based on the patient's signs and symptoms, I considered abdominal aortic aneurysm, small bowel obstruction, pancreatitis, acute cholecystitis, acute appendecitis, peptic ulcer disease, gastritis, colitis, endocrine disorders, irritable bowel syndrome and other differential diagnosis an etiology of the patient's abdominal pain.    All labs were reviewed and interpreted by me.  CT scan radiology interpretation was reviewed by me.    MDM               This patient is a pleasant 47-year-old male with some right-sided lower back pain rating down to the right lower quadrant of the abdomen and towards the groin, but worse with any movement.    I considered possibility of lumbar radiculopathy versus renal colic from obstructing stone versus appendicitis or even inguinal hernia.    I do not see any evidence of hernia on my exam and CT imaging was performed showing no obstructing ureteral stone  although he does have some nonobstructing right-sided nephrolithiasis.    Has a normal white blood cell count here and normal LFTs and lipase and negative urinalysis.    We gave him some Toradol for pain here and I consider his back pain to be more musculoskeletal in nature and possibly due to radiculopathy and he already has ibuprofen and gabapentin to take and I recommend he follows up with his primary care physician.                  Patient Care Considerations:          Consultants/Shared Management Plan:        Social Determinants of Health:    Patient is independent, reliable, and has access to care.       Disposition and Care Coordination:    Discharged: The patient is suitable and stable for discharge with no need for consideration of observation or admission.    I have explained the patient´s condition, diagnoses and treatment plan based on the information available to me at this time. I have answered questions and addressed any concerns. The patient has a good  understanding of the patient´s diagnosis, condition, and treatment plan as can be expected at this point. The vital signs have been stable. The patient´s condition is stable and appropriate for discharge from the emergency department.      The patient will pursue further outpatient evaluation with the primary care physician or other designated or consulting physician as outlined in the discharge instructions. They are agreeable to this plan of care and follow-up instructions have been explained in detail. The patient has received these instructions in written format and have expressed an understanding of the discharge instructions. The patient is aware that any significant change in condition or worsening of symptoms should prompt an immediate return to this or the closest emergency department or call to 911.  I have explained discharge medications and the need for follow up with the patient/caretakers. This was also printed in the discharge  instructions. Patient was discharged with the following medications and follow up:      Medication List      No changes were made to your prescriptions during this visit.      Joe García, APRN  [address]  [place] [address]  [phone]    Call in 2 days  As needed, If symptoms worsen, for a follow-up appointment       Final diagnoses:   Acute right lumbar radiculopathy   Acute right flank pain        ED Disposition     ED Disposition   Discharge    Condition   Stable    Comment   --             This medical record created using voice recognition software.           Christopher Mars MD  02/24/23 [id number]